# Patient Record
Sex: FEMALE | Race: WHITE | Employment: OTHER | URBAN - METROPOLITAN AREA
[De-identification: names, ages, dates, MRNs, and addresses within clinical notes are randomized per-mention and may not be internally consistent; named-entity substitution may affect disease eponyms.]

---

## 2021-04-14 ENCOUNTER — APPOINTMENT (OUTPATIENT)
Dept: CT IMAGING | Age: 81
End: 2021-04-14
Attending: PHYSICIAN ASSISTANT
Payer: MEDICARE

## 2021-04-14 ENCOUNTER — APPOINTMENT (OUTPATIENT)
Dept: GENERAL RADIOLOGY | Age: 81
End: 2021-04-14
Attending: EMERGENCY MEDICINE
Payer: MEDICARE

## 2021-04-14 ENCOUNTER — HOSPITAL ENCOUNTER (EMERGENCY)
Age: 81
Discharge: ANOTHER HOSPITAL | End: 2021-04-14
Attending: EMERGENCY MEDICINE | Admitting: EMERGENCY MEDICINE
Payer: MEDICARE

## 2021-04-14 VITALS
TEMPERATURE: 97.5 F | OXYGEN SATURATION: 97 % | RESPIRATION RATE: 17 BRPM | HEIGHT: 63 IN | SYSTOLIC BLOOD PRESSURE: 152 MMHG | WEIGHT: 165 LBS | BODY MASS INDEX: 29.23 KG/M2 | DIASTOLIC BLOOD PRESSURE: 64 MMHG | HEART RATE: 78 BPM

## 2021-04-14 DIAGNOSIS — M54.2 NECK PAIN: Primary | ICD-10-CM

## 2021-04-14 DIAGNOSIS — R29.898 BILATERAL ARM WEAKNESS: ICD-10-CM

## 2021-04-14 LAB
ABO + RH BLD: NORMAL
ALBUMIN SERPL-MCNC: 3.4 G/DL (ref 3.5–5)
ALBUMIN/GLOB SERPL: 1 {RATIO} (ref 1.1–2.2)
ALP SERPL-CCNC: 97 U/L (ref 45–117)
ALT SERPL-CCNC: 15 U/L (ref 12–78)
ANION GAP SERPL CALC-SCNC: 9 MMOL/L (ref 5–15)
APTT PPP: 22.3 SEC (ref 22.1–31)
AST SERPL-CCNC: 16 U/L (ref 15–37)
BILIRUB SERPL-MCNC: 0.3 MG/DL (ref 0.2–1)
BLOOD GROUP ANTIBODIES SERPL: NORMAL
BUN SERPL-MCNC: 18 MG/DL (ref 6–20)
BUN/CREAT SERPL: 20 (ref 12–20)
CALCIUM SERPL-MCNC: 8.5 MG/DL (ref 8.5–10.1)
CHLORIDE SERPL-SCNC: 107 MMOL/L (ref 97–108)
CO2 SERPL-SCNC: 22 MMOL/L (ref 21–32)
CREAT SERPL-MCNC: 0.89 MG/DL (ref 0.55–1.02)
ERYTHROCYTE [DISTWIDTH] IN BLOOD BY AUTOMATED COUNT: 16.6 % (ref 11.5–14.5)
ETHANOL SERPL-MCNC: <10 MG/DL
GLOBULIN SER CALC-MCNC: 3.5 G/DL (ref 2–4)
GLUCOSE SERPL-MCNC: 203 MG/DL (ref 65–100)
HCT VFR BLD AUTO: 35.8 % (ref 35–47)
HGB BLD-MCNC: 11.2 G/DL (ref 11.5–16)
INR PPP: 0.9 (ref 0.9–1.1)
LACTATE SERPL-SCNC: 2.7 MMOL/L (ref 0.4–2)
LIPASE SERPL-CCNC: 103 U/L (ref 73–393)
MCH RBC QN AUTO: 24.2 PG (ref 26–34)
MCHC RBC AUTO-ENTMCNC: 31.3 G/DL (ref 30–36.5)
MCV RBC AUTO: 77.5 FL (ref 80–99)
NRBC # BLD: 0 K/UL (ref 0–0.01)
NRBC BLD-RTO: 0 PER 100 WBC
PLATELET # BLD AUTO: 436 K/UL (ref 150–400)
PMV BLD AUTO: 9.2 FL (ref 8.9–12.9)
POTASSIUM SERPL-SCNC: 3.4 MMOL/L (ref 3.5–5.1)
PROT SERPL-MCNC: 6.9 G/DL (ref 6.4–8.2)
PROTHROMBIN TIME: 9.8 SEC (ref 9–11.1)
RBC # BLD AUTO: 4.62 M/UL (ref 3.8–5.2)
SODIUM SERPL-SCNC: 138 MMOL/L (ref 136–145)
SPECIMEN EXP DATE BLD: NORMAL
THERAPEUTIC RANGE,PTTT: NORMAL SECS (ref 58–77)
TROPONIN I SERPL-MCNC: <0.05 NG/ML
WBC # BLD AUTO: 23.2 K/UL (ref 3.6–11)

## 2021-04-14 PROCEDURE — 74011250636 HC RX REV CODE- 250/636: Performed by: EMERGENCY MEDICINE

## 2021-04-14 PROCEDURE — 83690 ASSAY OF LIPASE: CPT

## 2021-04-14 PROCEDURE — 93005 ELECTROCARDIOGRAM TRACING: CPT

## 2021-04-14 PROCEDURE — 90471 IMMUNIZATION ADMIN: CPT

## 2021-04-14 PROCEDURE — 86900 BLOOD TYPING SEROLOGIC ABO: CPT

## 2021-04-14 PROCEDURE — 96374 THER/PROPH/DIAG INJ IV PUSH: CPT

## 2021-04-14 PROCEDURE — 84484 ASSAY OF TROPONIN QUANT: CPT

## 2021-04-14 PROCEDURE — 70450 CT HEAD/BRAIN W/O DYE: CPT

## 2021-04-14 PROCEDURE — 85610 PROTHROMBIN TIME: CPT

## 2021-04-14 PROCEDURE — 83605 ASSAY OF LACTIC ACID: CPT

## 2021-04-14 PROCEDURE — 72125 CT NECK SPINE W/O DYE: CPT

## 2021-04-14 PROCEDURE — 99285 EMERGENCY DEPT VISIT HI MDM: CPT

## 2021-04-14 PROCEDURE — 82077 ASSAY SPEC XCP UR&BREATH IA: CPT

## 2021-04-14 PROCEDURE — 96375 TX/PRO/DX INJ NEW DRUG ADDON: CPT

## 2021-04-14 PROCEDURE — 71045 X-RAY EXAM CHEST 1 VIEW: CPT

## 2021-04-14 PROCEDURE — 73060 X-RAY EXAM OF HUMERUS: CPT

## 2021-04-14 PROCEDURE — 36415 COLL VENOUS BLD VENIPUNCTURE: CPT

## 2021-04-14 PROCEDURE — 85027 COMPLETE CBC AUTOMATED: CPT

## 2021-04-14 PROCEDURE — 90715 TDAP VACCINE 7 YRS/> IM: CPT | Performed by: EMERGENCY MEDICINE

## 2021-04-14 PROCEDURE — 80053 COMPREHEN METABOLIC PANEL: CPT

## 2021-04-14 PROCEDURE — 85730 THROMBOPLASTIN TIME PARTIAL: CPT

## 2021-04-14 RX ORDER — HYDROMORPHONE HYDROCHLORIDE 1 MG/ML
0.5 INJECTION, SOLUTION INTRAMUSCULAR; INTRAVENOUS; SUBCUTANEOUS ONCE
Status: COMPLETED | OUTPATIENT
Start: 2021-04-14 | End: 2021-04-14

## 2021-04-14 RX ORDER — FENTANYL CITRATE 50 UG/ML
50 INJECTION, SOLUTION INTRAMUSCULAR; INTRAVENOUS ONCE
Status: COMPLETED | OUTPATIENT
Start: 2021-04-14 | End: 2021-04-14

## 2021-04-14 RX ORDER — HYDROMORPHONE HYDROCHLORIDE 1 MG/ML
0.5 INJECTION, SOLUTION INTRAMUSCULAR; INTRAVENOUS; SUBCUTANEOUS ONCE
Status: DISCONTINUED | OUTPATIENT
Start: 2021-04-14 | End: 2021-04-14 | Stop reason: HOSPADM

## 2021-04-14 RX ORDER — ONDANSETRON 2 MG/ML
4 INJECTION INTRAMUSCULAR; INTRAVENOUS
Status: COMPLETED | OUTPATIENT
Start: 2021-04-14 | End: 2021-04-14

## 2021-04-14 RX ADMIN — TETANUS TOXOID, REDUCED DIPHTHERIA TOXOID AND ACELLULAR PERTUSSIS VACCINE, ADSORBED 0.5 ML: 5; 2.5; 8; 8; 2.5 SUSPENSION INTRAMUSCULAR at 16:39

## 2021-04-14 RX ADMIN — HYDROMORPHONE HYDROCHLORIDE 0.5 MG: 1 INJECTION, SOLUTION INTRAMUSCULAR; INTRAVENOUS; SUBCUTANEOUS at 18:00

## 2021-04-14 RX ADMIN — ONDANSETRON 4 MG: 2 INJECTION INTRAMUSCULAR; INTRAVENOUS at 15:48

## 2021-04-14 RX ADMIN — FENTANYL CITRATE 50 MCG: 50 INJECTION, SOLUTION INTRAMUSCULAR; INTRAVENOUS at 15:48

## 2021-04-14 NOTE — ED TRIAGE NOTES
Eber Rend about 4 feet off ladder onto right side of head, felt like neck got \"jammed\" sideways. C/o pain between shoulder blades and weakness/tingling in both arms/hands. L hand significantly weaker squeeze than R hand, both weak and tender.  Abrasions to face

## 2021-04-14 NOTE — ED NOTES
Pt. Requesting additional pain medication at this time. Spoke with Dr. Nanci Hull. New orders received.

## 2021-04-14 NOTE — ED NOTES
Report given to Andrae Umanzor RN at [facility]. Opportunity for questions and number provided.

## 2021-04-14 NOTE — ED PROVIDER NOTES
EMERGENCY DEPARTMENT HISTORY AND PHYSICAL EXAM      Date: 4/14/2021  Patient Name: Amparo Davison    History of Presenting Illness     Chief Complaint   Patient presents with   [address] off steps about 4 feet vertically  Landed face-first.  No LOC. No blood thinners. complaint of tenderness to the base of the c-spine.  Extremity Weakness     complaint of bilateral arm weakness    Neck Pain       History Provided By: Patient    HPI: Amparo Davison, 78 y.o. female presents to the ED with cc of fall from height. Patient is 78 YOF with fall from height. Patient reports she stepped over a wall and lost her balance. Approximately 4 feet. Patient is not on blood thinners. Patient reports she had a fall and fell and struck her head, laterally. Patient reports the pain in her neck pain is constant, and associated with bilateral arm pain and \"clumsiness. \"  It is located in her mid neck and back. Constant. Patient reports no paresthesias but does have difficulty controlling her hands \"like they feel clumsy. \" This is an acute change. Patient denies any other blood thinners. Unsure when her last tetanus shot is. Otherwise within normal state of health. There are no other complaints, changes, or physical findings at this time. PCP: Mikey Abebe MD    No current facility-administered medications on file prior to encounter. Current Outpatient Medications on File Prior to Encounter   Medication Sig Dispense Refill    HYDROcodone-acetaminophen (NORCO) 5-325 mg per tablet Take 1-2 Tabs by mouth every four (4) hours as needed for Pain. 30 Tab 0    metFORMIN (GLUCOPHAGE) 500 mg tablet Take 500 mg by mouth two (2) times daily (with meals).  amLODIPine (NORVASC) 10 mg tablet Take 10 mg by mouth every morning.  saxagliptin (ONGLYZA) 5 mg Tab tablet Take 5 mg by mouth every morning.  lisinopril (PRINIVIL, ZESTRIL) 20 mg tablet Take 20 mg by mouth two (2) times a day.         glimepiride (AMARYL) 4 mg tablet Take 4 mg by mouth two (2) times a day.  CYANOCOBALAMIN, VITAMIN B-12, (VITAMIN B-12 PO) Take  by mouth daily.  Ferrous Sulfate (IRON) 27 mg iron Tab Take  by mouth two (2) times a day. Past History     Past Medical History:  Past Medical History:   Diagnosis Date    Arthritis     Cancer ([facility] [address]     skin    Diabetes ([facility] [address]     Hypertension        Past Surgical History:  Past Surgical History:   Procedure Laterality Date    HX HEENT  8/8/12    EXCISION OF LEFT EAR LESION 2.1 x 3cm WITH LOCAL FLAP recon 4 x 8cm    HX ORTHOPAEDIC      ganglion cyst-right hand       Family History:  History reviewed. No pertinent family history. Social History:  Social History     Tobacco Use    Smoking status: Never Smoker    Smokeless tobacco: Never Used   Substance Use Topics    Alcohol use: No    Drug use: No       Allergies:  No Known Allergies      Review of Systems   Review of Systems   Constitutional: Negative for activity change, chills and fever. HENT: Negative for facial swelling and voice change. Eyes: Negative for redness. Respiratory: Negative for cough, shortness of breath and wheezing. Cardiovascular: Negative for chest pain and leg swelling. Gastrointestinal: Negative for abdominal pain, diarrhea, nausea and vomiting. Genitourinary: Negative for decreased urine volume. Musculoskeletal: Positive for neck pain. Negative for gait problem. Skin: Positive for wound. Negative for pallor and rash. Neurological: Positive for weakness. Negative for tremors, facial asymmetry and headaches. Psychiatric/Behavioral: Negative for agitation. All other systems reviewed and are negative. Physical Exam   Physical Exam  Vitals signs and nursing note reviewed. Constitutional:       Comments: 23-year-old female, resting in bed, no acute distress   HENT:      Head: Normocephalic.       Comments: Small abrasion over her frontal face  Neck: Musculoskeletal: Normal range of motion. Muscular tenderness (C7 to T1) present. Cardiovascular:      Rate and Rhythm: Normal rate and regular rhythm. Heart sounds: No murmur. No friction rub. No gallop. Pulmonary:      Effort: Pulmonary effort is normal.      Breath sounds: Normal breath sounds. Abdominal:      Palpations: Abdomen is soft. Tenderness: There is no abdominal tenderness. Musculoskeletal: Normal range of motion. General: No deformity or signs of injury. Comments: No bony tenderness   Skin:     General: Skin is warm. Capillary Refill: Capillary refill takes less than 2 seconds. Comments: Scattered abrasions   Neurological:      Mental Status: She is alert. Comments: GCS 15. RUE 4/5 flexion at elbow and 3/5 wrist extension,. Difficulty with hand . LUE 3/5 flexion at elbow and wrist flexion/extension. Weak hand . Hyperesthesias over left forearm.    Psychiatric:         Mood and Affect: Mood normal.         Diagnostic Study Results     Labs -     Recent Results (from the past 12 hour(s))   CBC W/O DIFF    Collection Time: 04/14/21  3:45 PM   Result Value Ref Range    WBC 23.2 (H) 3.6 - 11.0 K/uL    RBC 4.62 3.80 - 5.20 M/uL    HGB 11.2 (L) 11.5 - 16.0 g/dL    HCT 35.8 35.0 - 47.0 %    MCV 77.5 (L) 80.0 - 99.0 FL    MCH 24.2 (L) 26.0 - 34.0 PG    MCHC 31.3 30.0 - 36.5 g/dL    RDW 16.6 (H) 11.5 - 14.5 %    PLATELET 395 (H) 872 - 400 K/uL    MPV 9.2 8.9 - 12.9 FL    NRBC 0.0 0  WBC    ABSOLUTE NRBC 0.00 0.00 - 3.19 K/uL   METABOLIC PANEL, COMPREHENSIVE    Collection Time: 04/14/21  3:45 PM   Result Value Ref Range    Sodium 138 136 - 145 mmol/L    Potassium 3.4 (L) 3.5 - 5.1 mmol/L    Chloride 107 97 - 108 mmol/L    CO2 22 21 - 32 mmol/L    Anion gap 9 5 - 15 mmol/L    Glucose 203 (H) 65 - 100 mg/dL    BUN 18 6 - 20 MG/DL    Creatinine 0.89 0.55 - 1.02 MG/DL    BUN/Creatinine ratio 20 12 - 20      GFR est AA >60 >60 ml/min/1.73m2    GFR est non-AA >60 >60 ml/min/1.73m2    Calcium 8.5 8.5 - 10.1 MG/DL    Bilirubin, total 0.3 0.2 - 1.0 MG/DL    ALT (SGPT) 15 12 - 78 U/L    AST (SGOT) 16 15 - 37 U/L    Alk. phosphatase 97 45 - 117 U/L    Protein, total 6.9 6.4 - 8.2 g/dL    Albumin 3.4 (L) 3.5 - 5.0 g/dL    Globulin 3.5 2.0 - 4.0 g/dL    A-G Ratio 1.0 (L) 1.1 - 2.2     LIPASE    Collection Time: 04/14/21  3:45 PM   Result Value Ref Range    Lipase 103 73 - 393 U/L   ETHYL ALCOHOL    Collection Time: 04/14/21  3:45 PM   Result Value Ref Range    ALCOHOL(ETHYL),SERUM <10 <10 MG/DL   LACTIC ACID    Collection Time: 04/14/21  3:45 PM   Result Value Ref Range    Lactic acid 2.7 (HH) 0.4 - 2.0 MMOL/L   TROPONIN I    Collection Time: 04/14/21  3:45 PM   Result Value Ref Range    Troponin-I, Qt. <0.05 <0.05 ng/mL   PROTHROMBIN TIME + INR    Collection Time: 04/14/21  3:45 PM   Result Value Ref Range    INR 0.9 0.9 - 1.1      Prothrombin time 9.8 9.0 - 11.1 sec   PTT    Collection Time: 04/14/21  3:45 PM   Result Value Ref Range    aPTT 22.3 22.1 - 31.0 sec    aPTT, therapeutic range     58.0 - 77.0 SECS       Radiologic Studies -   XR HUMERUS LT   Final Result   No acute fracture. XR CHEST PORT   Final Result   1. No radiographic evidence of acute cardiopulmonary disease. CT HEAD WO CONT   Final Result         No acute abnormality      CT SPINE CERV WO CONT   Final Result   1. No acute abnormality            CT Results  (Last 48 hours)               04/14/21 1606  CT HEAD WO CONT Final result    Impression:          No acute abnormality       Narrative:  EXAM: CT HEAD WO CONT       INDICATION: fall       COMPARISON: None. CONTRAST: None. TECHNIQUE: Unenhanced CT of the head was performed using 5 mm images. Brain and   bone windows were generated. Coronal and sagittal reformats. CT dose reduction   was achieved through use of a standardized protocol tailored for this   examination and automatic exposure control for dose modulation. FINDINGS:   The ventricles and sulci are normal in size, shape and configuration. . Mild   low-density in the periventricular white matter. There is no intracranial   hemorrhage, extra-axial collection, or mass effect. The basilar cisterns are   open. No CT evidence of acute infarct. The bone windows demonstrate no abnormalities. The visualized portions of the   paranasal sinuses and mastoid air cells are clear. 04/14/21 1606  CT SPINE CERV WO CONT Final result    Impression:  1. No acute abnormality           Narrative:  INDICATION:  fall        STUDY:  CT SPINE CERV WO CONT        Examination: Cervical spine CT. No contrast or comparisons. Thin section axial   images were obtained with sagittal and coronal reformats. CT dose reduction was   achieved through use of a standardized protocol tailored for this examination   and automatic exposure control for dose modulation. FINDINGS: Alignment of the cervical spine is normal. There is no bony   destructive lesion or evidence of acute fracture. There are vascular   calcifications. Scattered degenerative change. CXR Results  (Last 48 hours)               04/14/21 1639  XR CHEST PORT Final result    Impression:  1. No radiographic evidence of acute cardiopulmonary disease. Narrative:  INDICATION: . fall   Additional history: Alea Kael approximately 4 feet off a ladder on the right side. Pain between shoulder blades and weakness/tingling in both arms/hands   COMPARISON: None. Meenu Nathan FINDINGS: PA and lateral view of the chest.    .   Lines/tubes/surgical: Cardiac monitor leads overly the patient. Heart/mediastinum: Unremarkable. Lungs/pleura: Low lung volumes without visible acute process. No visualized   pleural effusion or pneumothorax. Additional Comments: Degenerative change in the spine which is not well   visualized. .             Medical Decision Making   I am the first provider for this patient.     I reviewed the vital signs, available nursing notes, past medical history, past surgical history, family history and social history. Vital Signs-Reviewed the patient's vital signs. Patient Vitals for the past 12 hrs:   Temp Pulse Resp BP SpO2   04/14/21 1700  77 14 (!) 149/63 97 %   04/14/21 1645  72 14 (!) 141/65 99 %   04/14/21 1615  75 19 (!) 139/59 98 %   04/14/21 1522 97.5 °F (36.4 °C) 73 16 (!) 132/59 100 %       Records Reviewed: Nursing Notes and Old Medical Records    Provider Notes (Medical Decision Making):     53-year-old female presents after a fall from height with new neuro deficits. Her vitals are stable. Her airway is intact. Patient evaluated triage, code trauma initiated. Initial survey notable for intact airway, bilateral breath sounds, good pulses however there does appear to be weakness and difficulty with coordinating her bilateral upper extremities. With her neck pain and extension injury, concern for central cord syndrome, fracture, radiculopathy. Patient placed in c-collar. FAST exam was performed by me negative. Discussed with [facility] transfer center given age and mechanism with neuro deficits and accepted. ED Course:   Initial assessment performed. The patients presenting problems have been discussed, and they are in agreement with the care plan formulated and outlined with them. I have encouraged them to ask questions as they arise throughout their visit. ED Course as of Apr 14 1721   Wed Apr 14, 2021   1622 CT head and C-spine are unremarkable. [MB]   0027 On reassessment, patient endorses tenderness along left bicep. Will check left upper extremity plain film, although I feel no step-off or crepitus. Patient continues to have weakness with flexion in her right upper extremity and wrist extension as well as hand . In her left upper extremity she has weakness with flexion and left hand .   Given bilateral arm weakness after a fall we will continue cervical spine precautions and transfer to trauma center. [MB]   9590 Patient's labs are notable for leukocytosis I suspect this may be secondary to trauma and demargination as she has no fever or infectious symptoms. CMP is unremarkable. Lactate is mildly elevated 2.7 likely secondary to trauma not sepsis. Plain film as interpreted by me shows no clear fracture. [MB]   3409 Preliminary EKG interpreted by me. Shows sinus rhythm with a HR of 74. No STEMI. Right bundle branch block pattern. [MB]   1721 XR HUMERUS LT [MB]   1721 XR CHEST PORT [MB]      ED Course User Index  [MB] Vitor Thornton MD         PROCEDURES    Procedure Note - Limited Bedside Ultrasound- FAST  4:21 PM  Performed by: myself  Indication: trauma  Interpretation:  Pt with Negative exam showing no free fluid at the carreras pouch, suprapubic or costosplenic angle was visualized. Pt without pericardial effusion. CT imaging was not conducted for confirmation. The procedure took 1-15 minutes, and pt tolerated well. Madeleine Ascencio MD          Critical Care Time:   CRITICAL CARE NOTE :    4:06 PM    IMPENDING DETERIORATION -CNS  ASSOCIATED RISK FACTORS - Trauma and CNS Decompensation  MANAGEMENT- Bedside Assessment and Transfer  INTERPRETATION -  Xrays, CT Scan and ECG  INTERVENTIONS - Metobolic interventions and cervical colar  CASE REVIEW - Nursing  TREATMENT RESPONSE -Stable  PERFORMED BY - Self    NOTES   :  I have spent 45 minutes of critical care time involved in lab review, consultations with specialist, family decision- making, bedside attention and documentation. This time excludes time spent in any separate billed procedures. During this entire length of time I was immediately available to the patient . Madeleine Ascencio MD      Disposition:    Transferred to Another Facility      Diagnosis     Clinical Impression:   1. Neck pain    2.  Bilateral arm weakness        Attestations:    Madeleine Ascencio MD    Please note that this dictation was completed with Nextnav, the [employer] voice recognition software. Quite often unanticipated grammatical, syntax, homophones, and other interpretive errors are inadvertently transcribed by the computer software. Please disregard these errors. Please excuse any errors that have escaped final proofreading. Thank you.

## 2021-04-15 LAB
ATRIAL RATE: 74 BPM
CALCULATED P AXIS, ECG09: 50 DEGREES
CALCULATED R AXIS, ECG10: -34 DEGREES
CALCULATED T AXIS, ECG11: -12 DEGREES
DIAGNOSIS, 93000: NORMAL
P-R INTERVAL, ECG05: 154 MS
Q-T INTERVAL, ECG07: 464 MS
QRS DURATION, ECG06: 130 MS
QTC CALCULATION (BEZET), ECG08: 515 MS
VENTRICULAR RATE, ECG03: 74 BPM

## 2024-11-11 ENCOUNTER — TRANSCRIBE ORDERS (OUTPATIENT)
Facility: HOSPITAL | Age: 84
End: 2024-11-11

## 2024-11-11 DIAGNOSIS — Z95.2 S/P TAVR (TRANSCATHETER AORTIC VALVE REPLACEMENT): Primary | ICD-10-CM

## 2025-06-25 RX ORDER — ASPIRIN 81 MG/1
81 TABLET ORAL DAILY
COMMUNITY

## 2025-06-25 RX ORDER — SPIRONOLACTONE 25 MG/1
25 TABLET ORAL DAILY
COMMUNITY
Start: 2024-07-31 | End: 2025-12-12

## 2025-06-25 RX ORDER — LOSARTAN POTASSIUM 50 MG/1
50 TABLET ORAL DAILY
COMMUNITY
Start: 2024-07-31 | End: 2025-07-31

## 2025-06-25 RX ORDER — ROSUVASTATIN CALCIUM 20 MG/1
20 TABLET, COATED ORAL DAILY
COMMUNITY
Start: 2024-07-31 | End: 2025-07-31

## 2025-06-25 RX ORDER — METOPROLOL SUCCINATE 25 MG/1
25 TABLET, EXTENDED RELEASE ORAL DAILY
COMMUNITY
Start: 2024-08-14 | End: 2025-08-14

## 2025-06-25 RX ORDER — ACETAMINOPHEN 500 MG
1000 TABLET ORAL EVERY 6 HOURS PRN
COMMUNITY

## 2025-06-25 RX ORDER — FUROSEMIDE 40 MG/1
40 TABLET ORAL PRN
COMMUNITY
Start: 2024-07-31 | End: 2025-07-31

## 2025-06-25 NOTE — PERIOP NOTE
such as Dial or the soap provided at your preassessment appointment. If needed, ask someone for help to reach all areas of your body. Don’t forget to clean your belly button! Rinse.  With bottle of Hibiclens/Chlorhexidine in hand, turn water off.  Apply Hibiclens antiseptic skin cleanser with a clean, freshly washed washcloth.  Gently apply to your body from chin to toes (except the genital area) and especially the area(s) where your incision(s) will be.  Leave Hibiclens/Chlorhexidine on your skin for at least 20 seconds.     CAUTION: If needed, Hibiclens/chlorhexidine may be used to clean the folds of skin of the legs (such as in the area of the groin) and on your buttocks and hips. However, do not use Hibiclens/Chlorhexidine above the neck or in the genital area (your bottom) or put inside any area of your body.  Turn the water back on and rinse.  Dry gently with a clean, freshly washed towel.  After your shower, do not use any powder, deodorant, perfumes or lotion prior to surgery.  Put on clean, freshly washed clothing.    Tips to help prevent infections after your surgery:  Protect your surgical wound from germs:  Hand washing is the most important thing you and your caregivers can do to prevent infections.  Keep your bandage clean and dry!  Do not touch your surgical wound.  Use clean, freshly washed towels and washcloths every time you shower; do not share bath linens with others.  Until your surgical wound is healed, wear clothing and sleep on bed linens each day that are clean and freshly washed.  Do not allow pets to sleep in your bed with you or touch your surgical wound.  Do not smoke - smoking delays wound healing. This may be a good time to stop smoking.  If you have diabetes, it is important for you to manage your blood sugar levels properly before your surgery as well as after your surgery. Poorly managed blood sugar levels slow down wound healing and prevent you from healing completely.    If you  lose your Hibiclens/chlorhexidine, please call the Surgery Center, or it is available for purchase at your pharmacy.            Reviewed instructions with patient, able to verbalized understanding.         ___________________      ___________________      ________________  (Signature of Patient)          (Witness)                   (Date and Time)

## 2025-06-30 NOTE — PERIOP NOTE
1315: Spoke to patient today, stated her cardiologist  cleared her for her surgery and was told to continue her Aspirin as prescribed.    1343: Call from Daphne at Dr. Stallworth's office ( cardiologist) , as per same Dr. Stallworth is out for 2 weeks and  doctor covering for him is unable to give written  Aspirin pre-op instruction and clearance unless pt is seen by Dr. Mcqueen or other option is to get it from [facility] doctor or to try to get it from [facility] cardiologist.    15:48: Spoke to representative Delgado Burt from [facility], who will relay message to cardiologit need for Aspirin Pre-op instruction and cardiology clearance.    1606: Call from Fransisco from [facility] cardiology. I faxed request form for Aspirin and cardiology clearance.

## 2025-06-30 NOTE — PERIOP NOTE
Spoke to Destiney at cardiologist's office and as per same they have received form for Aspirin pre-op instructions and cardiology clearance request to be sent back to [facility] once filled up by cardiologist.

## 2025-07-01 ENCOUNTER — ANESTHESIA EVENT (OUTPATIENT)
Facility: HOSPITAL | Age: 85
End: 2025-07-01
Payer: MEDICARE

## 2025-07-02 ENCOUNTER — ANESTHESIA (OUTPATIENT)
Facility: HOSPITAL | Age: 85
End: 2025-07-02
Payer: MEDICARE

## 2025-07-02 ENCOUNTER — HOSPITAL ENCOUNTER (OUTPATIENT)
Facility: HOSPITAL | Age: 85
Setting detail: OUTPATIENT SURGERY
Discharge: HOME | End: 2025-07-02
Attending: PLASTIC SURGERY | Admitting: PLASTIC SURGERY
Payer: MEDICARE

## 2025-07-02 VITALS
DIASTOLIC BLOOD PRESSURE: 60 MMHG | BODY MASS INDEX: 23.96 KG/M2 | SYSTOLIC BLOOD PRESSURE: 136 MMHG | RESPIRATION RATE: 19 BRPM | WEIGHT: 135.2 LBS | TEMPERATURE: 97.4 F | HEIGHT: 63 IN | HEART RATE: 82 BPM | OXYGEN SATURATION: 100 %

## 2025-07-02 DIAGNOSIS — C44.319 BASAL CELL CARCINOMA (BCC) OF LEFT CHEEK: Primary | ICD-10-CM

## 2025-07-02 LAB
GLUCOSE BLD STRIP.AUTO-MCNC: 195 MG/DL (ref 65–117)
SERVICE CMNT-IMP: ABNORMAL

## 2025-07-02 PROCEDURE — 3700000001 HC ADD 15 MINUTES (ANESTHESIA): Performed by: PLASTIC SURGERY

## 2025-07-02 PROCEDURE — 88305 TISSUE EXAM BY PATHOLOGIST: CPT

## 2025-07-02 PROCEDURE — 3600000012 HC SURGERY LEVEL 2 ADDTL 15MIN: Performed by: PLASTIC SURGERY

## 2025-07-02 PROCEDURE — 6360000002 HC RX W HCPCS: Performed by: PLASTIC SURGERY

## 2025-07-02 PROCEDURE — 6360000002 HC RX W HCPCS: Performed by: NURSE ANESTHETIST, CERTIFIED REGISTERED

## 2025-07-02 PROCEDURE — 2709999900 HC NON-CHARGEABLE SUPPLY: Performed by: PLASTIC SURGERY

## 2025-07-02 PROCEDURE — 2580000003 HC RX 258: Performed by: ANESTHESIOLOGY

## 2025-07-02 PROCEDURE — 3700000000 HC ANESTHESIA ATTENDED CARE: Performed by: PLASTIC SURGERY

## 2025-07-02 PROCEDURE — 3600000002 HC SURGERY LEVEL 2 BASE: Performed by: PLASTIC SURGERY

## 2025-07-02 PROCEDURE — 7100000010 HC PHASE II RECOVERY - FIRST 15 MIN: Performed by: PLASTIC SURGERY

## 2025-07-02 PROCEDURE — 7100000011 HC PHASE II RECOVERY - ADDTL 15 MIN: Performed by: PLASTIC SURGERY

## 2025-07-02 PROCEDURE — 82962 GLUCOSE BLOOD TEST: CPT

## 2025-07-02 PROCEDURE — 2500000003 HC RX 250 WO HCPCS: Performed by: PLASTIC SURGERY

## 2025-07-02 PROCEDURE — 7100000000 HC PACU RECOVERY - FIRST 15 MIN: Performed by: PLASTIC SURGERY

## 2025-07-02 PROCEDURE — 7100000001 HC PACU RECOVERY - ADDTL 15 MIN: Performed by: PLASTIC SURGERY

## 2025-07-02 PROCEDURE — 2500000003 HC RX 250 WO HCPCS: Performed by: NURSE ANESTHETIST, CERTIFIED REGISTERED

## 2025-07-02 RX ORDER — WATER 10 ML/10ML
INJECTION INTRAMUSCULAR; INTRAVENOUS; SUBCUTANEOUS
Status: DISCONTINUED
Start: 2025-07-02 | End: 2025-07-02 | Stop reason: HOSPADM

## 2025-07-02 RX ORDER — OXYCODONE HYDROCHLORIDE 5 MG/1
10 TABLET ORAL PRN
Status: DISCONTINUED | OUTPATIENT
Start: 2025-07-02 | End: 2025-07-02 | Stop reason: HOSPADM

## 2025-07-02 RX ORDER — ACETAMINOPHEN 500 MG
1000 TABLET ORAL
Status: DISCONTINUED | OUTPATIENT
Start: 2025-07-02 | End: 2025-07-02 | Stop reason: HOSPADM

## 2025-07-02 RX ORDER — SODIUM CHLORIDE 9 MG/ML
INJECTION, SOLUTION INTRAVENOUS PRN
Status: DISCONTINUED | OUTPATIENT
Start: 2025-07-02 | End: 2025-07-02 | Stop reason: HOSPADM

## 2025-07-02 RX ORDER — OXYCODONE HYDROCHLORIDE 5 MG/1
5 TABLET ORAL PRN
Status: DISCONTINUED | OUTPATIENT
Start: 2025-07-02 | End: 2025-07-02 | Stop reason: HOSPADM

## 2025-07-02 RX ORDER — SODIUM CHLORIDE 0.9 % (FLUSH) 0.9 %
5-40 SYRINGE (ML) INJECTION EVERY 12 HOURS SCHEDULED
Status: DISCONTINUED | OUTPATIENT
Start: 2025-07-02 | End: 2025-07-02 | Stop reason: HOSPADM

## 2025-07-02 RX ORDER — SODIUM CHLORIDE 0.9 % (FLUSH) 0.9 %
5-40 SYRINGE (ML) INJECTION PRN
Status: DISCONTINUED | OUTPATIENT
Start: 2025-07-02 | End: 2025-07-02 | Stop reason: HOSPADM

## 2025-07-02 RX ORDER — SODIUM CHLORIDE, SODIUM LACTATE, POTASSIUM CHLORIDE, CALCIUM CHLORIDE 600; 310; 30; 20 MG/100ML; MG/100ML; MG/100ML; MG/100ML
INJECTION, SOLUTION INTRAVENOUS CONTINUOUS
Status: DISCONTINUED | OUTPATIENT
Start: 2025-07-02 | End: 2025-07-02 | Stop reason: HOSPADM

## 2025-07-02 RX ORDER — LIDOCAINE HYDROCHLORIDE AND EPINEPHRINE 10; 10 MG/ML; UG/ML
INJECTION, SOLUTION INFILTRATION; PERINEURAL PRN
Status: DISCONTINUED | OUTPATIENT
Start: 2025-07-02 | End: 2025-07-02 | Stop reason: ALTCHOICE

## 2025-07-02 RX ORDER — FENTANYL CITRATE 50 UG/ML
INJECTION, SOLUTION INTRAMUSCULAR; INTRAVENOUS
Status: DISCONTINUED | OUTPATIENT
Start: 2025-07-02 | End: 2025-07-02 | Stop reason: SDUPTHER

## 2025-07-02 RX ORDER — LIDOCAINE HYDROCHLORIDE 10 MG/ML
1 INJECTION, SOLUTION EPIDURAL; INFILTRATION; INTRACAUDAL; PERINEURAL
Status: DISCONTINUED | OUTPATIENT
Start: 2025-07-02 | End: 2025-07-02 | Stop reason: HOSPADM

## 2025-07-02 RX ORDER — ONDANSETRON 2 MG/ML
INJECTION INTRAMUSCULAR; INTRAVENOUS
Status: DISCONTINUED | OUTPATIENT
Start: 2025-07-02 | End: 2025-07-02 | Stop reason: SDUPTHER

## 2025-07-02 RX ORDER — NALOXONE HYDROCHLORIDE 0.4 MG/ML
INJECTION, SOLUTION INTRAMUSCULAR; INTRAVENOUS; SUBCUTANEOUS PRN
Status: DISCONTINUED | OUTPATIENT
Start: 2025-07-02 | End: 2025-07-02 | Stop reason: HOSPADM

## 2025-07-02 RX ORDER — HYDROCODONE BITARTRATE AND ACETAMINOPHEN 5; 325 MG/1; MG/1
1 TABLET ORAL EVERY 6 HOURS PRN
Qty: 10 TABLET | Refills: 0 | Status: SHIPPED | OUTPATIENT
Start: 2025-07-02 | End: 2025-07-05

## 2025-07-02 RX ORDER — KETOROLAC TROMETHAMINE 30 MG/ML
15 INJECTION, SOLUTION INTRAMUSCULAR; INTRAVENOUS
Status: DISCONTINUED | OUTPATIENT
Start: 2025-07-02 | End: 2025-07-02 | Stop reason: HOSPADM

## 2025-07-02 RX ORDER — FENTANYL CITRATE 50 UG/ML
25 INJECTION, SOLUTION INTRAMUSCULAR; INTRAVENOUS EVERY 5 MIN PRN
Status: DISCONTINUED | OUTPATIENT
Start: 2025-07-02 | End: 2025-07-02 | Stop reason: HOSPADM

## 2025-07-02 RX ORDER — ONDANSETRON 2 MG/ML
4 INJECTION INTRAMUSCULAR; INTRAVENOUS
Status: DISCONTINUED | OUTPATIENT
Start: 2025-07-02 | End: 2025-07-02 | Stop reason: HOSPADM

## 2025-07-02 RX ORDER — DEXAMETHASONE SODIUM PHOSPHATE 4 MG/ML
INJECTION, SOLUTION INTRA-ARTICULAR; INTRALESIONAL; INTRAMUSCULAR; INTRAVENOUS; SOFT TISSUE
Status: DISCONTINUED | OUTPATIENT
Start: 2025-07-02 | End: 2025-07-02 | Stop reason: SDUPTHER

## 2025-07-02 RX ORDER — CEFAZOLIN SODIUM 1 G/3ML
INJECTION, POWDER, FOR SOLUTION INTRAMUSCULAR; INTRAVENOUS
Status: DISCONTINUED
Start: 2025-07-02 | End: 2025-07-02 | Stop reason: HOSPADM

## 2025-07-02 RX ORDER — TRANEXAMIC ACID 100 MG/ML
INJECTION, SOLUTION INTRAVENOUS
Status: DISCONTINUED | OUTPATIENT
Start: 2025-07-02 | End: 2025-07-02 | Stop reason: SDUPTHER

## 2025-07-02 RX ADMIN — FENTANYL CITRATE 25 MCG: 50 INJECTION, SOLUTION INTRAMUSCULAR; INTRAVENOUS at 09:27

## 2025-07-02 RX ADMIN — TRANEXAMIC ACID 1000 MG: 1 INJECTION, SOLUTION INTRAVENOUS at 10:25

## 2025-07-02 RX ADMIN — PROPOFOL 30 MG: 10 INJECTION, EMULSION INTRAVENOUS at 09:45

## 2025-07-02 RX ADMIN — FENTANYL CITRATE 25 MCG: 50 INJECTION, SOLUTION INTRAMUSCULAR; INTRAVENOUS at 09:35

## 2025-07-02 RX ADMIN — FENTANYL CITRATE 25 MCG: 50 INJECTION, SOLUTION INTRAMUSCULAR; INTRAVENOUS at 09:23

## 2025-07-02 RX ADMIN — ONDANSETRON HYDROCHLORIDE 4 MG: 2 INJECTION, SOLUTION INTRAMUSCULAR; INTRAVENOUS at 09:40

## 2025-07-02 RX ADMIN — DEXAMETHASONE SODIUM PHOSPHATE 4 MG: 4 INJECTION, SOLUTION INTRAMUSCULAR; INTRAVENOUS at 09:40

## 2025-07-02 RX ADMIN — SODIUM CHLORIDE, SODIUM LACTATE, POTASSIUM CHLORIDE, AND CALCIUM CHLORIDE: .6; .31; .03; .02 INJECTION, SOLUTION INTRAVENOUS at 08:41

## 2025-07-02 RX ADMIN — PROPOFOL 70 MCG/KG/MIN: 10 INJECTION, EMULSION INTRAVENOUS at 09:28

## 2025-07-02 RX ADMIN — WATER 2000 MG: 1 INJECTION INTRAMUSCULAR; INTRAVENOUS; SUBCUTANEOUS at 09:35

## 2025-07-02 RX ADMIN — FENTANYL CITRATE 25 MCG: 50 INJECTION, SOLUTION INTRAMUSCULAR; INTRAVENOUS at 09:45

## 2025-07-02 RX ADMIN — PROPOFOL 30 MG: 10 INJECTION, EMULSION INTRAVENOUS at 09:27

## 2025-07-02 ASSESSMENT — PAIN - FUNCTIONAL ASSESSMENT: PAIN_FUNCTIONAL_ASSESSMENT: 0-10

## 2025-07-02 NOTE — PERIOP NOTE
Pt tolerating liquids, vss. Pt denies any pain.  D/c instructions reviewed with pt's Daughter, all questions answered.  Reviewed when to call the doctor,diet,activity and hygiene.  Reviewed when/what to take for pain, use of vaseline, and to move around some today.  Pt has her hearing aides and she was transported via w/c to awaiting transportation.

## 2025-07-02 NOTE — DISCHARGE INSTRUCTIONS
May shower tomorrow and wash face and hair and incisions.    Apply thin layer of vaseline to sutures twice daily.    No heavy lifting or vigorous activity.    Follow up with Dr. Panchal in 1 week. Call [phone] to make appt.    TAKE NARCOTIC PAIN MEDICATIONS WITH FOOD     Narcotics tend to be constipating, we suggest taking a stool softener such as Colace or Miralax (follow package instructions).    DO NOT DRIVE WHILE TAKING NARCOTIC PAIN MEDICATIONS.    DO NOT TAKE SLEEPING MEDICATIONS OR ANTIANXIETY MEDICATIONS WHILE TAKING NARCOTIC PAIN MEDICATIONS,  ESPECIALLY THE NIGHT OF ANESTHESIA!    CPAP PATIENTS BE SURE TO WEAR MACHINE WHENEVER NAPPING OR SLEEPING!    PATIENT INSTRUCTIONS:    After General Anesthesia or Intravenous Sedation, for 24 hours or while taking prescription Narcotics:        Someone should be with you for the next 24 hours.        For your own safety, a responsible adult must drive you home.  Limit your activities  Recommended activity: Rest today, up with assistance today. Do not climb stairs or shower unattended for the next 24 hours.  Please start with a soft bland diet and advance as tolerated (no nausea) to regular diet.  If you have a sore throat you should try the following: fluids, warm salt water gargles, or throat lozenges. If it does not improve after several days please follow up with your primary physician.  Do not drive and operate hazardous machinery  Do not make important personal or business decisions  Do  not drink alcoholic beverages  If you have not urinated within 8 hours after discharge, please contact your surgeon on call.    Report the following to your surgeon:  Excessive pain, swelling, redness or odor of or around the surgical area  Temperature over 100.5  Nausea and vomiting lasting longer than 4 hours or if unable to take medications  Any signs of decreased circulation or nerve impairment to extremity: change in color, persistent  numbness, tingling, coldness or

## 2025-07-02 NOTE — PERIOP NOTE
Permission received to review discharge instructions and discuss private health information with daughter in law Anu and will have someone with them after discharge     Hearing aids in pacu

## 2025-07-02 NOTE — OP NOTE
[facility]              [address] [place]  [address]                            OPERATIVE REPORT      PATIENT NAME: MYRON CHINCHILLA                  : 1940  MED REC NO: [id number]                       ROOM: [facility]  ACCOUNT NO: [id number]                       ADMIT DATE: 2025  PROVIDER: Reinier Panchal MD    DATE OF SERVICE:  2025    PREOPERATIVE DIAGNOSES:  Basal cell carcinoma of left cheek.    POSTOPERATIVE DIAGNOSES:  Basal cell carcinoma of left cheek.    PROCEDURES PERFORMED:  Excision of left cheek basal cell carcinoma measuring 4 x 7 cm with cervical facial rotation flap reconstruction measuring 14 x 8 cm.    SURGEON:  Reinier Panchal MD    ASSISTANT:  Clemencia Pisano.    ANESTHESIA:  Local with IV sedation.    ESTIMATED BLOOD LOSS:  Less than 10 mL.    SPECIMENS REMOVED:  Left cheek basal cell carcinoma.    INTRAOPERATIVE FINDINGS:  Basal cell carcinoma.     COMPLICATIONS:  None.    IMPLANTS:  None.    INDICATIONS:  This 84-year-old female presented with a large biopsy-proven basal cell carcinoma of the left preauricular cheek that required excision and reconstruction.    DESCRIPTION OF PROCEDURE:  After informed consent was obtained under IV sedation, the left cheek and neck were infiltrated with 1% lidocaine with epinephrine and then prepped and draped.  Under loupe magnification, the visible lesion was excised down to superficial fascia leaving 5 mm peripheral margins.  It was marked with a single stitch superiorly and sent for permanent pathology.    Attention was then turned to the reconstruction.  This was a large defect that could not be closed primarily.  An inferomedially based cervical facial flap was *** incising posterior to the defect into the lobule down into the neck.  A cervical fascial flap was sharply elevated on top of the SMAS and platysma.  Hemostasis was obtained.  The flap was rotated into the defect and secured

## 2025-07-02 NOTE — PERIOP NOTE
Ericka Wu  1940  [id number]    Situation:  Verbal report given from: FIDEL Aranda CRNA, RN  Procedure: Procedure(s):  EXCISION LEFT CHEEK SKIN CANCER WITH LOCAL FLAP    Background:    Preoperative diagnosis: Basal cell carcinoma of forehead [C44.319]    Postoperative diagnosis: * No post-op diagnosis entered *    :  Dr. Panchal    Assistant(s): Circulator: Michael Allen RN  Surgical Assistant: Clemencia Pisano  Scrub Person First: Jerad Tompkins  Circulator Assist: Trudy Villarreal RN    Specimens:   ID Type Source Tests Collected by Time Destination   1 : Left cheek basal cell carcinoma Tissue Cheek SURGICAL PATHOLOGY Reinier Panchal MD 7/2/2025 1109        Assessment:  Intra-procedure medications         Anesthesia gave intra-procedure sedation and medications, see anesthesia flow sheet     Intravenous fluids: LR@ KVO     Vital signs stable       Recommendation:

## 2025-07-02 NOTE — ANESTHESIA PRE PROCEDURE
Department of Anesthesiology  Preprocedure Note       Name:  Ericka uW   Age:  84 y.o.  :  1940                                          MRN:  [id number]         Date:  2025      Surgeon: Surgeon(s):  Reinier Panchal MD    Procedure: Procedure(s):  EXCISION LEFT CHEEK SKIN CANCER WITH LOCAL FLAP OR SKIN GRAFT    Medications prior to admission:   Prior to Admission medications    Medication Sig Start Date End Date Taking? Authorizing Provider   vitamin D 25 MCG (1000 UT) CAPS Take 25 mcg by mouth daily   Yes Dawson Magaña MD   acetaminophen (TYLENOL) 500 MG tablet Take 2 tablets by mouth every 6 hours as needed   Yes Dawson Magaña MD   diclofenac sodium (VOLTAREN) 1 % GEL Apply 2 g topically 2 times daily 24  Yes Dawson Magaña MD   empagliflozin (JARDIANCE) 10 MG tablet Take 1 tablet by mouth daily 24 Yes Dawson Magaña MD   furosemide (LASIX) 40 MG tablet Take 1 tablet by mouth as needed 24 Yes ProviderDawson MD   losartan (COZAAR) 50 MG tablet Take 1 tablet by mouth daily 24 Yes Dawson Magaña MD   metFORMIN (GLUCOPHAGE) 500 MG tablet Take 1 tablet by mouth 2 times daily (with meals)   Yes ProviderDawson MD   metoprolol succinate (TOPROL XL) 25 MG extended release tablet Take 1 tablet by mouth daily 24 Yes Dawson Magaña MD   rosuvastatin (CRESTOR) 20 MG tablet Take 1 tablet by mouth daily 24 Yes ProviderDawson MD   spironolactone (ALDACTONE) 25 MG tablet Take 1 tablet by mouth daily 24 Yes Dawson Magaña MD   aspirin 81 MG EC tablet Take 1 tablet by mouth daily Prescribed by cardiologist   Yes ProviderDawson MD       Current medications:    No current facility-administered medications for this encounter.       Allergies:  No Known Allergies    Problem List:  There is no problem list on file for this patient.      Past Medical History:

## 2025-07-02 NOTE — BRIEF OP NOTE
Brief Postoperative Note      Patient: Ericka Wu  YOB: 1940  MRN: [id number]    Date of Procedure: 7/2/2025    Pre-Op Diagnosis Codes:      * Basal cell carcinoma of forehead [C44.319]    Post-Op Diagnosis: Same       Procedure(s):  EXCISION LEFT CHEEK SKIN CANCER WITH LOCAL FLAP excision 4 x 7cm, cervicofacial rotation flap 14 x 8cm    Surgeon(s):  Reinier Panchal MD    Assistant:  Surgical Assistant: Clemencia Pisano    Anesthesia: Monitor Anesthesia Care    Estimated Blood Loss (mL): less than 50     Complications: None    Specimens:   ID Type Source Tests Collected by Time Destination   1 : Left cheek basal cell carcinoma Tissue Cheek SURGICAL PATHOLOGY Reinier Panchal MD 7/2/2025 0959        Implants:  * No implants in log *      Drains: * No LDAs found *    Findings:  Infection Present At Time Of Surgery (PATOS) (choose all levels that have infection present):  No infection present  Other Findings: large tumor  This procedure was not performed to treat primary cutaneous melanoma through wide local excision    Electronically signed by Reinier Panchal MD on 7/2/2025 at 10:40 AM

## 2025-07-02 NOTE — ANESTHESIA POSTPROCEDURE EVALUATION
Department of Anesthesiology  Postprocedure Note    Patient: Ericka Wu  MRN: [id number]  YOB: 1940  Date of evaluation: 7/2/2025    Procedure Summary       Date: 07/02/25 Room / Location: [facility]  / [facility] OR    Anesthesia Start: 0923 Anesthesia Stop: 1038    Procedure: EXCISION LEFT CHEEK SKIN CANCER WITH LOCAL FLAP (Left: Face) Diagnosis:       Basal cell carcinoma of forehead      (Basal cell carcinoma of forehead [C44.319])    Surgeons: Reinier Panchal MD Responsible Provider: Shirley Suero MD    Anesthesia Type: MAC ASA Status: 3            Anesthesia Type: MAC    Oliverio Phase I: Oliverio Score: 10    Oliverio Phase II: Oliverio Score: 10    Anesthesia Post Evaluation    Patient location during evaluation: PACU  Patient participation: complete - patient participated  Level of consciousness: awake and alert  Pain score: 0  Airway patency: patent  Nausea & Vomiting: no vomiting and no nausea  Cardiovascular status: blood pressure returned to baseline and hemodynamically stable  Respiratory status: acceptable  Hydration status: stable  Multimodal analgesia pain management approach  Pain management: satisfactory to patient    No notable events documented.

## (undated) DEVICE — MICRODISSECTION NEEDLE STRAIGHT SLEEVE: Brand: COLORADO

## (undated) DEVICE — SOLUTION IRRIG 1000ML 09% SOD CHL USP PIC PLAS CONTAINER

## (undated) DEVICE — SPONGE GZ W4XL4IN COT 12 PLY TYP VII WVN C FLD DSGN STERILE

## (undated) DEVICE — GLOVE ORANGE PI 7   MSG9070

## (undated) DEVICE — SUTURE PERMAHAND SZ 4-0 L18IN NONABSORBABLE BLK L19MM PS-2 1677G

## (undated) DEVICE — SUTURE PROL SZ 5-0 L18IN NONABSORBABLE BLU L19MM PS-2 3/8 8686G

## (undated) DEVICE — SOLUTION PREP 8OZ 10% POVIDONE IOD UNSCENTED SCR TOP BTL

## (undated) DEVICE — SUTURE VICRYL SZ 4-0 L27IN ABSRB UD L17MM RB-1 1/2 CIR J214H

## (undated) DEVICE — SPONGE GZ W4XL4IN COT RADPQ HIGHLY ABSRB STERILE

## (undated) DEVICE — Device

## (undated) DEVICE — SUTURE PROL SZ 4-0 L18IN NONABSORBABLE BLU L19MM PS-2 3/8 8682G